# Patient Record
Sex: FEMALE | Race: WHITE | NOT HISPANIC OR LATINO | Employment: OTHER | URBAN - METROPOLITAN AREA
[De-identification: names, ages, dates, MRNs, and addresses within clinical notes are randomized per-mention and may not be internally consistent; named-entity substitution may affect disease eponyms.]

---

## 2023-12-26 ENCOUNTER — HOSPITAL ENCOUNTER (EMERGENCY)
Facility: CLINIC | Age: 63
Discharge: HOME | End: 2023-12-26
Attending: EMERGENCY MEDICINE | Admitting: EMERGENCY MEDICINE
Payer: COMMERCIAL

## 2023-12-26 ENCOUNTER — APPOINTMENT (OUTPATIENT)
Dept: CT IMAGING | Facility: CLINIC | Age: 63
End: 2023-12-26
Attending: EMERGENCY MEDICINE
Payer: COMMERCIAL

## 2023-12-26 VITALS
HEIGHT: 66 IN | TEMPERATURE: 98.7 F | SYSTOLIC BLOOD PRESSURE: 154 MMHG | OXYGEN SATURATION: 96 % | BODY MASS INDEX: 24.11 KG/M2 | RESPIRATION RATE: 13 BRPM | DIASTOLIC BLOOD PRESSURE: 94 MMHG | HEART RATE: 106 BPM | WEIGHT: 150 LBS

## 2023-12-26 DIAGNOSIS — R11.2 NAUSEA AND VOMITING, UNSPECIFIED VOMITING TYPE: ICD-10-CM

## 2023-12-26 DIAGNOSIS — K55.1 ATHEROSCLEROSIS OF SUPERIOR MESENTERIC ARTERY (H): ICD-10-CM

## 2023-12-26 LAB
ALBUMIN SERPL BCG-MCNC: 4.1 G/DL (ref 3.5–5.2)
ALP SERPL-CCNC: 88 U/L (ref 40–150)
ALT SERPL W P-5'-P-CCNC: 41 U/L (ref 0–50)
ANION GAP SERPL CALCULATED.3IONS-SCNC: 12 MMOL/L (ref 7–15)
AST SERPL W P-5'-P-CCNC: 49 U/L (ref 0–45)
ATRIAL RATE - MUSE: 96 BPM
BASOPHILS # BLD AUTO: 0 10E3/UL (ref 0–0.2)
BASOPHILS NFR BLD AUTO: 0 %
BILIRUB SERPL-MCNC: 0.5 MG/DL
BUN SERPL-MCNC: 30.1 MG/DL (ref 8–23)
CALCIUM SERPL-MCNC: 8.6 MG/DL (ref 8.8–10.2)
CHLORIDE SERPL-SCNC: 102 MMOL/L (ref 98–107)
CREAT SERPL-MCNC: 0.69 MG/DL (ref 0.51–0.95)
DEPRECATED HCO3 PLAS-SCNC: 25 MMOL/L (ref 22–29)
DIASTOLIC BLOOD PRESSURE - MUSE: NORMAL MMHG
EGFRCR SERPLBLD CKD-EPI 2021: >90 ML/MIN/1.73M2
EOSINOPHIL # BLD AUTO: 0 10E3/UL (ref 0–0.7)
EOSINOPHIL NFR BLD AUTO: 0 %
ERYTHROCYTE [DISTWIDTH] IN BLOOD BY AUTOMATED COUNT: 12.6 % (ref 10–15)
GLUCOSE SERPL-MCNC: 177 MG/DL (ref 70–99)
HCT VFR BLD AUTO: 48.4 % (ref 35–47)
HGB BLD-MCNC: 15.6 G/DL (ref 11.7–15.7)
IMM GRANULOCYTES # BLD: 0 10E3/UL
IMM GRANULOCYTES NFR BLD: 0 %
INTERPRETATION ECG - MUSE: NORMAL
LYMPHOCYTES # BLD AUTO: 0.2 10E3/UL (ref 0.8–5.3)
LYMPHOCYTES NFR BLD AUTO: 2 %
MCH RBC QN AUTO: 29.4 PG (ref 26.5–33)
MCHC RBC AUTO-ENTMCNC: 32.2 G/DL (ref 31.5–36.5)
MCV RBC AUTO: 91 FL (ref 78–100)
MONOCYTES # BLD AUTO: 0.5 10E3/UL (ref 0–1.3)
MONOCYTES NFR BLD AUTO: 5 %
NEUTROPHILS # BLD AUTO: 9.3 10E3/UL (ref 1.6–8.3)
NEUTROPHILS NFR BLD AUTO: 93 %
NRBC # BLD AUTO: 0 10E3/UL
NRBC BLD AUTO-RTO: 0 /100
P AXIS - MUSE: 52 DEGREES
PLATELET # BLD AUTO: 220 10E3/UL (ref 150–450)
POTASSIUM SERPL-SCNC: 4.8 MMOL/L (ref 3.4–5.3)
PR INTERVAL - MUSE: 150 MS
PROT SERPL-MCNC: 6.7 G/DL (ref 6.4–8.3)
QRS DURATION - MUSE: 70 MS
QT - MUSE: 338 MS
QTC - MUSE: 427 MS
R AXIS - MUSE: 55 DEGREES
RBC # BLD AUTO: 5.31 10E6/UL (ref 3.8–5.2)
SODIUM SERPL-SCNC: 139 MMOL/L (ref 135–145)
SYSTOLIC BLOOD PRESSURE - MUSE: NORMAL MMHG
T AXIS - MUSE: 70 DEGREES
TROPONIN T SERPL HS-MCNC: <6 NG/L
VENTRICULAR RATE- MUSE: 96 BPM
WBC # BLD AUTO: 10.1 10E3/UL (ref 4–11)

## 2023-12-26 PROCEDURE — 84484 ASSAY OF TROPONIN QUANT: CPT | Performed by: EMERGENCY MEDICINE

## 2023-12-26 PROCEDURE — 93005 ELECTROCARDIOGRAM TRACING: CPT

## 2023-12-26 PROCEDURE — 85025 COMPLETE CBC W/AUTO DIFF WBC: CPT | Performed by: EMERGENCY MEDICINE

## 2023-12-26 PROCEDURE — 258N000003 HC RX IP 258 OP 636: Performed by: EMERGENCY MEDICINE

## 2023-12-26 PROCEDURE — 74177 CT ABD & PELVIS W/CONTRAST: CPT

## 2023-12-26 PROCEDURE — 99285 EMERGENCY DEPT VISIT HI MDM: CPT | Mod: 25

## 2023-12-26 PROCEDURE — 96374 THER/PROPH/DIAG INJ IV PUSH: CPT | Mod: 59

## 2023-12-26 PROCEDURE — 250N000011 HC RX IP 250 OP 636: Performed by: EMERGENCY MEDICINE

## 2023-12-26 PROCEDURE — 36415 COLL VENOUS BLD VENIPUNCTURE: CPT | Performed by: EMERGENCY MEDICINE

## 2023-12-26 PROCEDURE — 80053 COMPREHEN METABOLIC PANEL: CPT | Performed by: EMERGENCY MEDICINE

## 2023-12-26 PROCEDURE — 96361 HYDRATE IV INFUSION ADD-ON: CPT

## 2023-12-26 RX ORDER — IOPAMIDOL 755 MG/ML
75 INJECTION, SOLUTION INTRAVASCULAR ONCE
Status: COMPLETED | OUTPATIENT
Start: 2023-12-26 | End: 2023-12-26

## 2023-12-26 RX ORDER — ONDANSETRON 2 MG/ML
4 INJECTION INTRAMUSCULAR; INTRAVENOUS ONCE
Status: COMPLETED | OUTPATIENT
Start: 2023-12-26 | End: 2023-12-26

## 2023-12-26 RX ORDER — ONDANSETRON 4 MG/1
4 TABLET, ORALLY DISINTEGRATING ORAL EVERY 8 HOURS PRN
Qty: 10 TABLET | Refills: 0 | Status: SHIPPED | OUTPATIENT
Start: 2023-12-26 | End: 2023-12-29

## 2023-12-26 RX ADMIN — SODIUM CHLORIDE 1000 ML: 9 INJECTION, SOLUTION INTRAVENOUS at 06:01

## 2023-12-26 RX ADMIN — IOPAMIDOL 75 ML: 755 INJECTION, SOLUTION INTRAVENOUS at 07:37

## 2023-12-26 RX ADMIN — ONDANSETRON 4 MG: 2 INJECTION INTRAMUSCULAR; INTRAVENOUS at 08:23

## 2023-12-26 ASSESSMENT — ACTIVITIES OF DAILY LIVING (ADL)
ADLS_ACUITY_SCORE: 35

## 2023-12-26 NOTE — ED TRIAGE NOTES
BIBA from home.  Per EMS, pt  anxiety, with abdomen pain and had an emesis. VSS,,  18 G RAC, NS 250cc and Droperidol 10 mg total administered IV, Nausea, abd pain and anxiety resolved. Hx Anxiety, Depression.   Pt A/O x 4 per ED arrival. Calm and cooperative denied pain when asked. Pt denied SI/HI when asked.   Triage Assessment (Adult)       Row Name 12/26/23 0409          Triage Assessment    Airway WDL WDL        Respiratory WDL    Respiratory WDL WDL        Skin Circulation/Temperature WDL    Skin Circulation/Temperature WDL WDL        Cardiac WDL    Cardiac WDL WDL     Cardiac Rhythm ST  104        Peripheral/Neurovascular WDL    Peripheral Neurovascular WDL WDL        Cognitive/Neuro/Behavioral WDL    Cognitive/Neuro/Behavioral WDL WDL

## 2023-12-26 NOTE — ED NOTES
Bed: ED11  Expected date: 12/26/23  Expected time: 3:50 AM  Means of arrival: Ambulance  Comments:  HEMS 439 24F Abd.pain/panic attack

## 2023-12-26 NOTE — ED TRIAGE NOTES
Triage Assessment (Adult)       Row Name 12/26/23 0409          Triage Assessment    Airway WDL WDL        Respiratory WDL    Respiratory WDL WDL        Skin Circulation/Temperature WDL    Skin Circulation/Temperature WDL WDL        Cardiac WDL    Cardiac WDL WDL     Cardiac Rhythm ST  104        Peripheral/Neurovascular WDL    Peripheral Neurovascular WDL WDL        Cognitive/Neuro/Behavioral WDL    Cognitive/Neuro/Behavioral WDL WDL

## 2023-12-26 NOTE — ED PROVIDER NOTES
"  History   Chief Complaint:  Abdominal Pain, Nausea & Vomiting, and Anxiety     HPI   Salina Chen is a 63 year old female who presents to the emergency department for evaluation of abdominal pain and vomiting.  Per EMS the pateint was very anxious and appeared to be having a panic attack and was given droperidol.  She tells me she was feeling very anxious before but is not feeling that way anymore. She states her stomach does not particularly hurt anymore and she would like to go home.    Medications:    Ativan  Remeron  Inderal   Senokot  Desyrel   Effexor     Past Medical History:    Vascular dementia   Major depressive disorder  Hypertension   Simple endometrial hyperplasia without atypia   Vitamin D deficiency   Colonic polyps   Bulimia nervosa   Anxiety disorder  Attention deficit disorder  Insomnia   No transfusions per Islam beliefs     Past Surgical History:    Cholecystectomy  Refractive surgery, bilateral  Hemorrhoid surgery   Colonoscopy      Physical Exam   Patient Vitals for the past 24 hrs:   BP Temp Temp src Pulse Resp SpO2 Height Weight   12/26/23 0600 (!) 154/94 -- -- 106 13 96 % -- --   12/26/23 0511 (!) 151/88 -- -- 118 22 95 % -- --   12/26/23 0500 (!) 151/84 -- -- 104 22 96 % -- --   12/26/23 0411 (!) 158/89 98.7  F (37.1  C) Oral 103 11 98 % 1.676 m (5' 6\") 68 kg (150 lb)      Physical Exam  General: Resting on the gurney, appears comfortable  Head:  The scalp, face, and head appear normal  Mouth/Throat: Mucus membranes are moist  CV:  Regular rate    Normal S1 and S2  No pathological murmur   Resp:  Breath sounds clear and equal bilaterally    Non-labored, no retractions or accessory muscle use    No coarseness    No wheezing   GI:  Abdomen is soft, no rigidity    No tenderness to palpation  MS:  Normal motor assessment of all extremities.    Good capillary refill noted.  Skin:  No rash or lesions noted.  Neuro:   Speech is normal and fluent. No apparent deficit.  Psych: Awake. " Alert.  Normal affect.      Appropriate interactions.      Emergency Department Course   ECG  ECG results from 12/26/23   EKG 12-lead, tracing only     Value    Systolic Blood Pressure     Diastolic Blood Pressure     Ventricular Rate 96    Atrial Rate 96    AK Interval 150    QRS Duration 70        QTc 427    P Axis 52    R AXIS 55    T Axis 70    Interpretation ECG      Sinus rhythm  Normal ECG  No previous ECGs available       Imaging:  CT Abdomen Pelvis w Contrast   Final Result   IMPRESSION:    1.  No acute findings in the abdomen or pelvis.   2.  Dilatation of the intrahepatic and extrahepatic bile ducts which   is nonspecific but favored secondary to postcholecystectomy reservoir   state. Correlate with clinical and laboratory values.   3.  Pancreas divisum without CT evidence of acute pancreatitis.   4.  Moderate burden of atherosclerotic disease with moderate stenosis   of the proximal SMA.      ROME HUYNH MD            SYSTEM ID:  [id number]      Reading per radiology.    Laboratory:  Labs Ordered and Resulted from Time of ED Arrival to Time of ED Departure   COMPREHENSIVE METABOLIC PANEL - Abnormal       Result Value    Sodium 139      Potassium 4.8      Carbon Dioxide (CO2) 25      Anion Gap 12      Urea Nitrogen 30.1 (*)     Creatinine 0.69      GFR Estimate >90      Calcium 8.6 (*)     Chloride 102      Glucose 177 (*)     Alkaline Phosphatase 88      AST 49 (*)     ALT 41      Protein Total 6.7      Albumin 4.1      Bilirubin Total 0.5     CBC WITH PLATELETS AND DIFFERENTIAL - Abnormal    WBC Count 10.1      RBC Count 5.31 (*)     Hemoglobin 15.6      Hematocrit 48.4 (*)     MCV 91      MCH 29.4      MCHC 32.2      RDW 12.6      Platelet Count 220      % Neutrophils 93      % Lymphocytes 2      % Monocytes 5      % Eosinophils 0      % Basophils 0      % Immature Granulocytes 0      NRBCs per 100 WBC 0      Absolute Neutrophils 9.3 (*)     Absolute Lymphocytes 0.2 (*)     Absolute Monocytes  0.5      Absolute Eosinophils 0.0      Absolute Basophils 0.0      Absolute Immature Granulocytes 0.0      Absolute NRBCs 0.0     TROPONIN T, HIGH SENSITIVITY - Normal    Troponin T, High Sensitivity <6       Procedures   None    Emergency Department Course & Assessments:     Interventions:  Medications   sodium chloride 0.9% BOLUS 1,000 mL (0 mLs Intravenous Stopped 12/26/23 0713)   iopamidol (ISOVUE-370) solution 75 mL (75 mLs Intravenous $Given 12/26/23 0737)   sodium chloride (PF) 0.9% PF flush 62 mL (62 mLs Intravenous $Given 12/26/23 0736)   ondansetron (ZOFRAN) injection 4 mg (4 mg Intravenous $Given 12/26/23 0823)      Assessments:  I obtained history and performed an exam of the patient as documented above.    Independent Interpretation (X-rays, CTs, rhythm strip):  None    Disposition:  The patient was discharged to home.     Impression & Plan      Medical Decision Making:  Salina Chen is a 63 year old female who presents to the emergency department for the evaluation of vomiting.  Lab evaluation shows without significant abnormality.  The patinet was given fluids and zofran with improvement in symptoms.  The abdominal exam is benign and hepatobiliary disease, obstruction, ischemia, of surgical etiology is highly unlikely.  While she does have SMA atherosclerosis, her pain is not out of proportion to exam, she does not have fear of eating or hx of pain with eating and feels much better with antiemetic.    Lab evaluation shoes no evidence of profound dehydration or electrolyte abnormality.    Considerable symptomatic improvement and hte patient is comfortable with close out patient follow up and strict return precautions.  Prescription for zofran given.      Diagnosis:  1. Nausea and vomiting, unspecified vomiting type    2. Atherosclerosis of superior mesenteric artery (H24)        Scribe Disclosure:  [place]owan, am serving as a scribe at 8:29 AM on 12/26/2023 to document services personally  performed by Lis Harris MD based on my observations and the provider's statements to me.      Lis Harris MD  12/26/23 0949

## 2024-01-20 ENCOUNTER — HEALTH MAINTENANCE LETTER (OUTPATIENT)
Age: 64
End: 2024-01-20

## 2025-01-26 ENCOUNTER — HEALTH MAINTENANCE LETTER (OUTPATIENT)
Age: 65
End: 2025-01-26